# Patient Record
Sex: FEMALE | Race: BLACK OR AFRICAN AMERICAN
[De-identification: names, ages, dates, MRNs, and addresses within clinical notes are randomized per-mention and may not be internally consistent; named-entity substitution may affect disease eponyms.]

---

## 2018-10-23 ENCOUNTER — HOSPITAL ENCOUNTER (OUTPATIENT)
Dept: HOSPITAL 92 - SCSULT | Age: 53
Discharge: HOME | End: 2018-10-23
Attending: UROLOGY
Payer: COMMERCIAL

## 2018-10-23 DIAGNOSIS — R39.14: ICD-10-CM

## 2018-10-23 DIAGNOSIS — N31.9: Primary | ICD-10-CM

## 2018-10-23 LAB
ANION GAP SERPL CALC-SCNC: 13 MMOL/L (ref 10–20)
BUN SERPL-MCNC: 15 MG/DL (ref 9.8–20.1)
CALCIUM SERPL-MCNC: 9.3 MG/DL (ref 7.8–10.44)
CHLORIDE SERPL-SCNC: 105 MMOL/L (ref 98–107)
CO2 SERPL-SCNC: 27 MMOL/L (ref 22–29)
CREAT CL PREDICTED SERPL C-G-VRATE: 0 ML/MIN (ref 70–130)
GLUCOSE SERPL-MCNC: 181 MG/DL (ref 70–105)
POTASSIUM SERPL-SCNC: 3.8 MMOL/L (ref 3.5–5.1)
SODIUM SERPL-SCNC: 141 MMOL/L (ref 136–145)

## 2018-10-23 PROCEDURE — 36415 COLL VENOUS BLD VENIPUNCTURE: CPT

## 2018-10-23 PROCEDURE — 80048 BASIC METABOLIC PNL TOTAL CA: CPT

## 2018-10-23 PROCEDURE — 76770 US EXAM ABDO BACK WALL COMP: CPT

## 2018-10-23 NOTE — ULT
RENAL SONOGRAM:

 

History: Neurogenic bladder. 

 

FINDINGS: 

Right kidney is 8.2 cm and the left is 10.7 cm. Each has a normal appearance without evidence of mass
, stone, or hydronephrosis. Urinary bladder shows a somewhat thickened wall. Volume measured at 115 c
c. 

 

IMPRESSION: 

1. Moderate amount of urine within bladder. 

2. No evidence of upper urinary tract obstruction.

 

POS: [facility]